# Patient Record
Sex: MALE | Race: WHITE | URBAN - NONMETROPOLITAN AREA
[De-identification: names, ages, dates, MRNs, and addresses within clinical notes are randomized per-mention and may not be internally consistent; named-entity substitution may affect disease eponyms.]

---

## 2023-05-05 ENCOUNTER — OFFICE VISIT (OUTPATIENT)
Dept: URBAN - NONMETROPOLITAN AREA CLINIC 8 | Facility: CLINIC | Age: 68
End: 2023-05-05
Payer: COMMERCIAL

## 2023-05-05 DIAGNOSIS — H25.13 AGE-RELATED NUCLEAR CATARACT, BILATERAL: ICD-10-CM

## 2023-05-05 DIAGNOSIS — Z79.84 LONG TERM CURRENT USE OF ORAL HYPOGLYCEMIC DRUG: ICD-10-CM

## 2023-05-05 DIAGNOSIS — H11.001 PTERYGIUM OF RIGHT EYE: ICD-10-CM

## 2023-05-05 DIAGNOSIS — H04.123 DRY EYE SYNDROME OF BILATERAL LACRIMAL GLANDS: ICD-10-CM

## 2023-05-05 DIAGNOSIS — H40.013 OPEN ANGLE WITH BORDERLINE FINDINGS, LOW RISK, BILATERAL: ICD-10-CM

## 2023-05-05 DIAGNOSIS — H43.813 VITREOUS DEGENERATION, BILATERAL: ICD-10-CM

## 2023-05-05 DIAGNOSIS — E11.9 TYPE 2 DIABETES MELLITUS W/O COMPLICATION: Primary | ICD-10-CM

## 2023-05-05 PROCEDURE — 92004 COMPRE OPH EXAM NEW PT 1/>: CPT | Performed by: OPTOMETRIST

## 2023-05-05 ASSESSMENT — VISUAL ACUITY
OD: 20/25
OS: 20/25

## 2023-05-05 ASSESSMENT — INTRAOCULAR PRESSURE
OD: 14
OS: 14

## 2023-05-05 ASSESSMENT — KERATOMETRY
OS: 41.63
OD: 41.38

## 2023-05-05 NOTE — IMPRESSION/PLAN
Impression: Open angle with borderline findings, low risk, bilateral: H40.013. Plan: Likely physiological cupping on previews OCT. Continue to monitor.

## 2023-05-05 NOTE — IMPRESSION/PLAN
Impression: Type 2 diabetes mellitus w/o complication: H30.3. Plan: No diabetic retinopathy today. Discussed importance of closely monitoring and controlling glucose to minimize risks of progression of disease. Patient instructed to notify clinic immediately if changes to vision are noted.

## 2024-05-03 ENCOUNTER — OFFICE VISIT (OUTPATIENT)
Dept: URBAN - NONMETROPOLITAN AREA CLINIC 8 | Facility: CLINIC | Age: 69
End: 2024-05-03
Payer: COMMERCIAL

## 2024-05-03 DIAGNOSIS — E11.9 TYPE 2 DIABETES MELLITUS W/O COMPLICATION: Primary | ICD-10-CM

## 2024-05-03 DIAGNOSIS — H40.013 OPEN ANGLE WITH BORDERLINE FINDINGS, LOW RISK, BILATERAL: ICD-10-CM

## 2024-05-03 DIAGNOSIS — Z79.84 LONG TERM CURRENT USE OF ORAL HYPOGLYCEMIC DRUG: ICD-10-CM

## 2024-05-03 DIAGNOSIS — H11.001 PTERYGIUM OF RIGHT EYE: ICD-10-CM

## 2024-05-03 DIAGNOSIS — H43.813 VITREOUS DEGENERATION, BILATERAL: ICD-10-CM

## 2024-05-03 DIAGNOSIS — H25.13 AGE-RELATED NUCLEAR CATARACT, BILATERAL: ICD-10-CM

## 2024-05-03 DIAGNOSIS — H04.123 DRY EYE SYNDROME OF BILATERAL LACRIMAL GLANDS: ICD-10-CM

## 2024-05-03 PROCEDURE — 92014 COMPRE OPH EXAM EST PT 1/>: CPT | Performed by: OPTOMETRIST

## 2024-05-03 PROCEDURE — 76514 ECHO EXAM OF EYE THICKNESS: CPT | Performed by: OPTOMETRIST

## 2024-05-03 ASSESSMENT — KERATOMETRY
OS: 41.63
OD: 41.63

## 2024-05-03 ASSESSMENT — INTRAOCULAR PRESSURE
OS: 14
OD: 15

## 2024-05-03 ASSESSMENT — VISUAL ACUITY
OS: 20/25
OD: 20/25

## 2025-05-15 ENCOUNTER — OFFICE VISIT (OUTPATIENT)
Dept: URBAN - NONMETROPOLITAN AREA CLINIC 8 | Facility: CLINIC | Age: 70
End: 2025-05-15
Payer: COMMERCIAL

## 2025-05-15 DIAGNOSIS — Z79.84 LONG TERM CURRENT USE OF ORAL HYPOGLYCEMIC DRUG: ICD-10-CM

## 2025-05-15 DIAGNOSIS — H25.813 COMBINED FORMS OF AGE-RELATED CATARACT, BILATERAL: ICD-10-CM

## 2025-05-15 DIAGNOSIS — H16.223 KERATOCONJUNCT SICCA, NOT SPECIFIED AS SJOGREN'S, BILATERAL: ICD-10-CM

## 2025-05-15 DIAGNOSIS — E11.9 TYPE 2 DIABETES MELLITUS W/O COMPLICATION: Primary | ICD-10-CM

## 2025-05-15 DIAGNOSIS — H43.813 VITREOUS DEGENERATION, BILATERAL: ICD-10-CM

## 2025-05-15 PROCEDURE — 92014 COMPRE OPH EXAM EST PT 1/>: CPT | Performed by: OPTOMETRIST

## 2025-05-15 PROCEDURE — 92134 CPTRZ OPH DX IMG PST SGM RTA: CPT | Performed by: OPTOMETRIST

## 2025-05-15 ASSESSMENT — VISUAL ACUITY
OD: 20/25
OS: 20/25

## 2025-05-15 ASSESSMENT — INTRAOCULAR PRESSURE
OD: 16
OS: 16

## 2025-05-15 ASSESSMENT — KERATOMETRY
OS: 41.50
OD: 41.38